# Patient Record
Sex: MALE | Race: BLACK OR AFRICAN AMERICAN | NOT HISPANIC OR LATINO | URBAN - METROPOLITAN AREA
[De-identification: names, ages, dates, MRNs, and addresses within clinical notes are randomized per-mention and may not be internally consistent; named-entity substitution may affect disease eponyms.]

---

## 2018-01-16 ENCOUNTER — EMERGENCY (EMERGENCY)
Facility: HOSPITAL | Age: 43
LOS: 1 days | Discharge: HOME | End: 2018-01-16
Payer: SELF-PAY

## 2018-01-16 VITALS
HEART RATE: 104 BPM | TEMPERATURE: 98 F | HEIGHT: 78 IN | DIASTOLIC BLOOD PRESSURE: 86 MMHG | WEIGHT: 289.91 LBS | OXYGEN SATURATION: 96 % | SYSTOLIC BLOOD PRESSURE: 145 MMHG | RESPIRATION RATE: 18 BRPM

## 2018-01-16 PROCEDURE — 73130 X-RAY EXAM OF HAND: CPT

## 2018-01-16 PROCEDURE — 99283 EMERGENCY DEPT VISIT LOW MDM: CPT | Mod: 25

## 2018-01-16 PROCEDURE — 99284 EMERGENCY DEPT VISIT MOD MDM: CPT

## 2018-01-16 PROCEDURE — 73130 X-RAY EXAM OF HAND: CPT | Mod: 26,RT

## 2018-01-16 RX ORDER — IBUPROFEN 200 MG
600 TABLET ORAL ONCE
Qty: 0 | Refills: 0 | Status: COMPLETED | OUTPATIENT
Start: 2018-01-16 | End: 2018-01-16

## 2018-01-16 RX ORDER — DICLOFENAC SODIUM 30 MG/G
2 GEL TOPICAL
Qty: 1 | Refills: 0 | OUTPATIENT
Start: 2018-01-16 | End: 2018-01-20

## 2018-01-16 RX ADMIN — Medication 600 MILLIGRAM(S): at 13:59

## 2018-01-16 RX ADMIN — Medication 600 MILLIGRAM(S): at 14:57

## 2018-01-16 NOTE — ED PROVIDER NOTE - PHYSICAL EXAMINATION
no cervical/midline tenderness, R paraspinal lumbar tenderness  R hand full ROM, tenderness 3/4th digit, no obvious deformity  RUE full ROM
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
## 2018-01-16 NOTE — ED PROVIDER NOTE - MEDICAL DECISION MAKING DETAILS
pt well appearing, vss afebrile, steady unassisted gait, [employer], xray low suspicion for R hand fx, most likely contusion, back pain suggestive strain, no midline tenderness, instructed to take scheduled Naproxen, heat and ortho follow up as needed.
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
